# Patient Record
Sex: MALE | Race: BLACK OR AFRICAN AMERICAN
[De-identification: names, ages, dates, MRNs, and addresses within clinical notes are randomized per-mention and may not be internally consistent; named-entity substitution may affect disease eponyms.]

---

## 2017-04-26 ENCOUNTER — HOSPITAL ENCOUNTER (EMERGENCY)
Dept: HOSPITAL 23 - CED | Age: 42
LOS: 1 days | Discharge: HOME | End: 2017-04-27
Payer: COMMERCIAL

## 2017-04-26 DIAGNOSIS — E66.01: ICD-10-CM

## 2017-04-26 DIAGNOSIS — Z79.899: ICD-10-CM

## 2017-04-26 DIAGNOSIS — R10.13: Primary | ICD-10-CM

## 2017-04-27 LAB
BARBITURATES UR QL SCN: 0.5 MG/DL (ref 0.2–2)
BARBITURATES UR QL SCN: 4.3 G/DL (ref 3.5–5)
BASOPHIL#: 0.1 X10E3 (ref 0–0.3)
BASOPHIL%: 1.1 % (ref 0–2.5)
BENZODIAZ UR QL SCN: 17 U/L (ref 10–42)
BENZODIAZ UR QL SCN: 27 U/L (ref 10–40)
BLOOD UREA NITROGEN: 20 MG/DL (ref 9–23)
BUN/CREATININE RATIO: 20
BZE UR QL SCN: 66 U/L (ref 32–92)
CALCIUM SERUM: 8.9 MG/DL (ref 8.4–10.2)
CK MB SERPL-RTO: 13.6 % (ref 11–15.5)
CK MB SERPL-RTO: 33 G/DL (ref 30–36)
CREATININE SERUM: 1 MG/DL (ref 0.6–1.4)
DIFF IND: NO
EOSINOPHIL#: 0.3 X10E3 (ref 0–0.7)
EOSINOPHIL%: 2.4 % (ref 0–7)
GLOM FILT RATE ESTIMATED: 107.1 ML/MIN (ref 60–?)
GLUCOSE FASTING: 98 MG/DL (ref 70–110)
HEMATOCRIT: 43.5 % (ref 38–50)
HEMOGLOBIN: 14.4 GM/DL (ref 13–16)
KETONES UR QL: 107 MMOL/L (ref 100–111)
KETONES UR QL: 25 MMOL/L (ref 22–31)
LIPASE: 28 U/L (ref 22–51)
LYMPHOCYTE#: 3.3 X10E3 (ref 1–3.5)
LYMPHOCYTE%: 30.6 % (ref 17–45)
MEAN CELL VOLUME: 88.4 FL (ref 83–96)
MEAN CORPUSCULAR HEMOGLOBIN: 29.2 PG (ref 28–34)
MEAN PLATELET VOLUME: 9.9 FL (ref 6.5–11.5)
METHADONE UR QL SCN: <1 NG/ML (ref 0–7.9)
METHADONE UR QL SCN: <1 NG/ML (ref 0–7.9)
MONOCYTE#: 0.7 X10E3 (ref 0–1)
MONOCYTE%: 6.3 % (ref 3–12)
NEUTROPHIL#: 6.4 X10E3 (ref 1.5–7.1)
NEUTROPHIL%: 59.6 % (ref 40–75)
PLATELET COUNT: 201 X10E3 (ref 140–420)
POC - TROPONIN: <0.05 NG/ML (ref ?–0.05)
POC - TROPONIN: <0.05 NG/ML (ref ?–0.05)
POTASSIUM: 4.1 MMOL/L (ref 3.5–5.1)
PROTEIN TOTAL SERUM: 7.2 G/DL (ref 6–8.3)
RED BLOOD COUNT: 4.93 X10E (ref 3.9–5.6)
SODIUM: 137 MMOL/L (ref 135–145)
WHITE BLOOD COUNT: 10.8 X10E3 (ref 4–10.5)

## 2018-03-30 ENCOUNTER — OFFICE VISIT (OUTPATIENT)
Dept: BARIATRICS/WEIGHT MGMT | Facility: CLINIC | Age: 43
End: 2018-03-30

## 2018-03-30 VITALS
SYSTOLIC BLOOD PRESSURE: 148 MMHG | HEART RATE: 73 BPM | TEMPERATURE: 98.7 F | RESPIRATION RATE: 18 BRPM | DIASTOLIC BLOOD PRESSURE: 94 MMHG | WEIGHT: 315 LBS | BODY MASS INDEX: 46.65 KG/M2 | HEIGHT: 69 IN

## 2018-03-30 DIAGNOSIS — I10 ESSENTIAL HYPERTENSION: ICD-10-CM

## 2018-03-30 DIAGNOSIS — K21.9 GASTROESOPHAGEAL REFLUX DISEASE, ESOPHAGITIS PRESENCE NOT SPECIFIED: Primary | ICD-10-CM

## 2018-03-30 DIAGNOSIS — Z98.84 HISTORY OF LAPAROSCOPIC ADJUSTABLE GASTRIC BANDING: ICD-10-CM

## 2018-03-30 DIAGNOSIS — M25.50 MULTIPLE JOINT PAIN: ICD-10-CM

## 2018-03-30 DIAGNOSIS — R53.82 CHRONIC FATIGUE: ICD-10-CM

## 2018-03-30 DIAGNOSIS — R13.19 OTHER DYSPHAGIA: ICD-10-CM

## 2018-03-30 PROCEDURE — 99204 OFFICE O/P NEW MOD 45 MIN: CPT | Performed by: SURGERY

## 2018-03-30 NOTE — PROGRESS NOTES
[facility] BARIATRIC [facility] BARIATRIC SURGERY  [address]  [facility] [address] [phone]  [address]  [facility] [address] [phone]  Dept: [phone]  3/30/2018      Liv Prince.  [id number]  [id number]  1975  male      Chief Complaint   Patient presents with   • Consult     OPAL band 2016        Post-Op Bariatric Surgery:   Liv Prince is status post Band procedure, performed on 2/17/16 at [facility].    HPI:   Today's weight is (!) 150 kg (330 lb) pounds, today's BMI is Body mass index is 48.73 kg/m²..  his greatest weight loss from surgery was 80 pounds. The patient reports an unwanted weight gain of 80 pounds.  [unfilled] denies fever, chills, chest pain, SOA, melena, hematochezia, hematemesis, dysuria, frequency, hematuria, jaundice or abdominal pain.    43 year old gentleman with morbid obesity status post LAP-BAND placement that were 2016 at [facility] who initially lost about 80 pounds but has not followed back for the past year.  He states that he does have heartburn and that he has occasional regurgitation of solid food.  He would like to transfer his care.  He went through his diet and exercise routine and is drinking with meals and eating a fair amount of carbs.      Diet and Exercise:   Diet history reviewed and discussed with the patient. Weight loss/gains to date discussed with the patient. He reports eating 3 meals per day, a typical portion size of 1 cup, eating 2 snacks per day, drinking 3 or more 8-oz. glasses of water per day, no carbonated beverage consumption and exercising regularly.     Supplements:  None     Review of Systems   Constitutional: Positive for fatigue.   Gastrointestinal: Positive for vomiting.   Musculoskeletal: Positive for arthralgias.   All other systems reviewed and are negative.      Patient Active Problem List   Diagnosis   • BMI 45.0-49.9, adult   • GERD  (gastroesophageal reflux disease)   • Multiple joint pain   • Chronic fatigue   • Essential hypertension   • Other dysphagia   • History of laparoscopic adjustable gastric banding       Past Medical History:   Diagnosis Date   • Gout    • Hypertension    • Weight gain        Past Surgical History:   Procedure Laterality Date   • LAPAROSCOPIC GASTRIC BANDING  02/17/2016    Dr. Petit       No Known Allergies      Current Outpatient Prescriptions:   •  meloxicam (MOBIC) 15 MG tablet, Take 15 mg by mouth Daily., Disp: , Rfl:   •  naproxen (NAPROSYN) 500 MG tablet, Take 500 mg by mouth 2 (Two) Times a Day With Meals., Disp: , Rfl:     Social History     Social History   • Marital status:      Spouse name: N/A   • Number of children: N/A   • Years of education: N/A     Occupational History   • Not on file.     Social History Main Topics   • Smoking status: Former Smoker   • Smokeless tobacco: Never Used   • Alcohol use No   • Drug use: No   • Sexual activity: Not on file     Other Topics Concern   • Not on file     Social History Narrative   • No narrative on file       Family History   Problem Relation Age of Onset   • Hypertension Mother        The following portions of the patient's history were reviewed and updated as appropriate: allergies, current medications, past family history, past medical history, past social history, past surgical history and problem list.    Vitals:    03/30/18 0926   BP: 148/94   Pulse: 73   Resp: 18   Temp: 98.7 °F (37.1 °C)       Physical Exam   Constitutional: He is oriented to person, place, and time. He appears well-nourished.   HENT:   Head: Normocephalic and atraumatic.   Mouth/Throat: Oropharynx is clear and moist.   Eyes: Conjunctivae and EOM are normal. Pupils are equal, round, and reactive to light. No scleral icterus.   Neck: Normal range of motion. Neck supple. No thyromegaly present.   Cardiovascular: Normal rate and regular rhythm.    Pulmonary/Chest: Effort normal  and breath sounds normal.   Abdominal: Soft. Bowel sounds are normal. He exhibits no distension. There is no tenderness. There is no rebound and no guarding. No hernia.   Musculoskeletal: Normal range of motion.   Lymphadenopathy:     He has no cervical adenopathy.   Neurological: He is alert and oriented to person, place, and time. No cranial nerve deficit. Coordination normal.   Skin: Skin is warm and dry. No erythema.   Psychiatric: He has a normal mood and affect. His behavior is normal.   Vitals reviewed.          Assessment:   Liv Prince has severe obesity with multiple co-morbidities who would like to transfer his bariatric care to us and participate in our bariatric program.      Encounter Diagnoses   Name Primary?   • Gastroesophageal reflux disease, esophagitis presence not specified Yes   • Other dysphagia    • BMI 45.0-49.9, adult    • Multiple joint pain    • Chronic fatigue    • Essential hypertension    • History of laparoscopic adjustable gastric banding          Discussion/Summary/Plan:     43-year-old gentleman status post LAP-BAND 2016 at [facility] who like to transfer his care to us.  He would like to continue to try to work with the lap band.  I gave him our bariatric manual and went over this with him.  Also discuss bariatric advantage shakes and with our dietitian.  Instructed him to cut straight on eating lean protein vegetables and fruit.  We will start out with an upper GI to rule out pouch dilatation and LAP-BAND slip.  We'll follow-up after the upper GI.    Recommended patient be sure to eat at least three meals per day all with high lean protein, vegetables and fruit. Be sure to limit/cut back on daily simple carbohydrate intake. Discussed with the patient the recommended amount of water per day to intake. Reviewed vitamin requirements. Be sure to do routine exercise including both cardio and strength training. Recommended patient to see our dietician to go into more  detail regarding diet changes. Also discussed BMR testing.    Instructions / Recommendations: dietary counseling recommended, recommended a daily protein intake of  grams, vitamin supplements recommended, recommended exercising at least 150 minutes per week, behavior modifications recommended and instructed to call the office for concerns, questions, or problems.    The patient was instructed to follow up in 3 weeks .     The patient was counseled regarding diet, exercise and the surgical procedures available. Our bariatric manual was given to the patient and was discussed in detail. Dietician appointment was highly recommended as well as attending support groups.  Total time of encounter was over 45 minutes and 40 minutes was spent counseling.

## 2018-03-30 NOTE — PATIENT INSTRUCTIONS
Bariatric Manual    You were provided a manual specific to the procedure that you have chosen.  Please refer to that with any questions or call the office at [phone]

## 2018-04-20 ENCOUNTER — OFFICE VISIT (OUTPATIENT)
Dept: BARIATRICS/WEIGHT MGMT | Facility: CLINIC | Age: 43
End: 2018-04-20

## 2018-04-20 VITALS
HEIGHT: 69 IN | DIASTOLIC BLOOD PRESSURE: 78 MMHG | RESPIRATION RATE: 18 BRPM | BODY MASS INDEX: 46.21 KG/M2 | TEMPERATURE: 98.5 F | HEART RATE: 77 BPM | SYSTOLIC BLOOD PRESSURE: 120 MMHG | WEIGHT: 312 LBS

## 2018-04-20 DIAGNOSIS — I10 ESSENTIAL HYPERTENSION: ICD-10-CM

## 2018-04-20 DIAGNOSIS — K21.9 GASTROESOPHAGEAL REFLUX DISEASE, ESOPHAGITIS PRESENCE NOT SPECIFIED: ICD-10-CM

## 2018-04-20 DIAGNOSIS — R53.82 CHRONIC FATIGUE: ICD-10-CM

## 2018-04-20 DIAGNOSIS — Z98.84 HISTORY OF LAPAROSCOPIC ADJUSTABLE GASTRIC BANDING: ICD-10-CM

## 2018-04-20 DIAGNOSIS — M25.50 MULTIPLE JOINT PAIN: ICD-10-CM

## 2018-04-20 DIAGNOSIS — R13.19 OTHER DYSPHAGIA: ICD-10-CM

## 2018-04-20 PROCEDURE — 99213 OFFICE O/P EST LOW 20 MIN: CPT | Performed by: SURGERY

## 2018-04-20 NOTE — PROGRESS NOTES
[facility] BARIATRIC [facility] BARIATRIC SURGERY  [address]  [facility] [id number] [phone]  [address]  [facility] [id number] [phone]  Dept: [phone]  4/20/2018      Liv Prince.  [id number]  [id number]  1975  male      Chief Complaint   Patient presents with   • Follow-up     Followup AGB       BH Post-Op Bariatric Surgery:   Liv Prince is status post Lapband procedure , performed on 2/17/16 with ?mls at [facility]:   Today's weight is (!) 142 kg (312 lb) pounds, today's BMI is Body mass index is 46.05 kg/m². and he has a loss of 18 pounds since the last visit. The patient reports experiencing hunger, but decreased hunger and loss of appetite.     Liv Prince denies abdominal pain. The patientdoes not have vomiting or regurgitation. The patientdoes have heartburn/reflux.    43 old gentleman status post LAP-BAND placement every 2016 at [facility] who transferred his care to us.  I saw patient couple weeks ago and scheduled an upper GI.  Patient states that he never did get called to have this scheduled.  He did start the bariatric advantage shakes and his lost 18 pounds in 2 weeks.  Patient states he would like to continue doing the shakes but add one meal.  He states that he feels better and denies any other complaints.    Patient states their portion size is the small plate and their hunger is appropriate.    Diet and Exercise: Diet history reviewed and discussed with the patient. Weight loss/gains to date discussed with the patient. He reports eating 0 meals per day, a typical portion size of  1 cup, eating - snack per day, drinking 5 8-oz. glasses of water per day. The patient can tolerate solid protein.   The patient is eating protein first. The patient is limiting food volume. The patient is not taking vitamins. The patient is limiting snacking. The patient is regular exercise. He is not drinking  carbonated beverages.     The following portions of the patient's history were reviewed and updated as appropriate: allergies, current medications, past family history, past medical history, past social history, past surgical history and problem list.    Review of Systems   Musculoskeletal: Positive for arthralgias and back pain.   All other systems reviewed and are negative.      Vitals:    04/20/18 0833   BP: 120/78   Pulse: 77   Resp: 18   Temp: 98.5 °F (36.9 °C)       Physical Exam   Constitutional: He is oriented to person, place, and time. He appears well-nourished.   HENT:   Head: Normocephalic and atraumatic.   Mouth/Throat: Oropharynx is clear and moist.   Eyes: Conjunctivae and EOM are normal. Pupils are equal, round, and reactive to light. No scleral icterus.   Neck: Normal range of motion. Neck supple. No thyromegaly present.   Cardiovascular: Normal rate and regular rhythm.    Pulmonary/Chest: Effort normal and breath sounds normal.   Abdominal: Soft. Bowel sounds are normal. He exhibits no distension. There is no tenderness. There is no rebound and no guarding. No hernia.   Musculoskeletal: Normal range of motion.   Lymphadenopathy:     He has no cervical adenopathy.   Neurological: He is alert and oriented to person, place, and time. No cranial nerve deficit. Coordination normal.   Skin: Skin is warm and dry. No erythema.   Psychiatric: He has a normal mood and affect. His behavior is normal.   Vitals reviewed.        Assessment: Post-operatively the patient status post LAP-BAND at [facility] who transferred his care to us.  He lost 18 pounds doing the bariatric advantage shakes.  I instructed him that we need to get the upper GI which I put a another order in.  I instructed patient that if he does not get called from [facility] to call them on Monday to make sure this is scheduled.  If he has any trouble instructed him to call our office if needed.  We'll follow-up after the upper GI.  He would  like to continue with the bariatric advantage shakes but will do 3 shakes and one meal.  That one meal.  A high-protein low-carb low-calorie type meal.  Also instructed to increase his exercise..     Encounter Diagnoses   Name Primary?   • BMI 45.0-49.9, adult Yes   • Gastroesophageal reflux disease, esophagitis presence not specified    • Multiple joint pain    • Chronic fatigue    • Essential hypertension    • Other dysphagia    • History of laparoscopic adjustable gastric banding        Plan:      No adjustment today as patient has ideal level of restriction. RTC for n/v/d/regurg. Encouraged patient to be sure to eat plenty of dense lean protein and high fiber foods like vegetables and fresh fruits while reducing simple carbohydrates. Reviewed the importance of eating solid foods vs. soft which will contribute to weight gain. Discussed the recommended amount of water to intake daily- half of body weight in ounces. Not drinking with or right after meals.    Activity restrictions: None.   Instructions / Recommendations: dietary counseling recommended, recommended a daily protein intake of  grams, patient was advised that the lap band system works best when consuming solid foods, vitamin supplement(s) recommended, recommended exercising at least 150 minutes per week inclduing both cardio and strength training, behavior modifications recommended and instructed to call the office for concerns, questions, or problems.     The patient was instructed to follow up in 3-4 weeks      The patient was counseled regarding. Total time spent face to face was 25 minutes and 20 minutes was spent counseling.

## 2018-05-07 ENCOUNTER — HOSPITAL ENCOUNTER (OUTPATIENT)
Dept: GENERAL RADIOLOGY | Facility: HOSPITAL | Age: 43
Discharge: HOME | End: 2018-05-07
Attending: SURGERY | Admitting: SURGERY

## 2018-05-07 PROCEDURE — 74241: CPT

## 2018-05-09 ENCOUNTER — TELEPHONE (OUTPATIENT)
Dept: BARIATRICS/WEIGHT MGMT | Facility: CLINIC | Age: 43
End: 2018-05-09

## 2018-05-18 ENCOUNTER — OFFICE VISIT (OUTPATIENT)
Dept: BARIATRICS/WEIGHT MGMT | Facility: CLINIC | Age: 43
End: 2018-05-18

## 2018-05-18 VITALS
DIASTOLIC BLOOD PRESSURE: 85 MMHG | RESPIRATION RATE: 18 BRPM | SYSTOLIC BLOOD PRESSURE: 134 MMHG | WEIGHT: 313 LBS | HEART RATE: 67 BPM | HEIGHT: 69 IN | BODY MASS INDEX: 46.36 KG/M2

## 2018-05-18 DIAGNOSIS — M25.50 MULTIPLE JOINT PAIN: ICD-10-CM

## 2018-05-18 DIAGNOSIS — R53.82 CHRONIC FATIGUE: ICD-10-CM

## 2018-05-18 DIAGNOSIS — I10 ESSENTIAL HYPERTENSION: ICD-10-CM

## 2018-05-18 DIAGNOSIS — Z98.84 HISTORY OF LAPAROSCOPIC ADJUSTABLE GASTRIC BANDING: ICD-10-CM

## 2018-05-18 DIAGNOSIS — K21.9 GASTROESOPHAGEAL REFLUX DISEASE WITHOUT ESOPHAGITIS: ICD-10-CM

## 2018-05-18 DIAGNOSIS — R63.2 POLYPHAGIA: ICD-10-CM

## 2018-05-18 PROBLEM — R13.19 OTHER DYSPHAGIA: Status: RESOLVED | Noted: 2018-03-30 | Resolved: 2018-05-18

## 2018-05-18 PROCEDURE — 43999 UNLISTED PROCEDURE STOMACH: CPT | Performed by: SURGERY

## 2018-05-18 NOTE — PROGRESS NOTES
[facility] BARIATRIC [facility] BARIATRIC SURGERY  [address]  [facility] [address] [phone]  [address]  [facility] [id number] [phone]  Dept: [phone]  5/18/2018      Liv Prince.  [id number]  [id number]  1975  male      Chief Complaint   Patient presents with   • Follow-up     Followup AGB        Post-Op Bariatric Surgery:   Liv Prince is status post Lapband procedure , performed on 2/17/16 [facility].     HPI:   Today's weight is (!) 142 kg (313 lb)  pounds, today's BMI is Body mass index is 46.2 kg/m². and he has a gain of 1 pounds since the last visit. The patient reports a portion size larger than the small plate, increased hunger and denies a loss of appetite.     Liv Prince denies nausea, dysphagia, or abdominal pain. The patientdoes not have vomitng. The patientdoes not have reflux.    Diet and Exercise: Diet history reviewed and discussed with the patient. Weight loss/gains to date discussed with the patient. He reports eating 3 meals per day, a typical portion size of greater than 1 cup, eating 2 snack per day, drinking 3 8-oz. glasses of water per day. The patient can tolerate solid protein.   The patient is eating protein first. The patient is not limiting food volume. The patient is not taking vitamins. The patient is not limiting snacking. The patient is exercising regularly. He is not drinking carbonated beverages.     The following portions of the patient's history were reviewed and updated as appropriate: allergies, current medications, past family history, past medical history, past social history, past surgical history and problem list.    Vitals:    05/18/18 0901   BP: 134/85   Pulse: 67   Resp: 18       Review of Systems   Musculoskeletal: Positive for arthralgias and back pain.   All other systems reviewed and are negative.      Physical Exam   Constitutional: He is oriented to person, place, and time.  He appears well-nourished.   HENT:   Head: Normocephalic and atraumatic.   Mouth/Throat: Oropharynx is clear and moist.   Eyes: Conjunctivae and EOM are normal. Pupils are equal, round, and reactive to light. No scleral icterus.   Neck: Normal range of motion. Neck supple. No thyromegaly present.   Cardiovascular: Normal rate and regular rhythm.    Pulmonary/Chest: Effort normal and breath sounds normal.   Abdominal: Soft. Bowel sounds are normal. He exhibits no distension. There is no tenderness. There is no rebound. No hernia.   Musculoskeletal: Normal range of motion.   Lymphadenopathy:     He has no cervical adenopathy.   Neurological: He is alert and oriented to person, place, and time. No cranial nerve deficit. Coordination normal.   Skin: Skin is warm and dry. No erythema.   Psychiatric: He has a normal mood and affect. His behavior is normal.   Vitals reviewed.        Assessment: Post-operatively the patient status post LAP-BAND at [facility] with complaints of eating larger portions and hungry between meals.  Patient would like to have an adjustment.  Upper GI was performed which revealed a partial herniation of the LAP-BAND.  Patient is asymptomatic without any complaints.  Adjustment as below    Encounter Diagnoses   Name Primary?   • BMI 45.0-49.9, adult Yes   • Gastroesophageal reflux disease without esophagitis    • Multiple joint pain    • Chronic fatigue    • Essential hypertension    • History of laparoscopic adjustable gastric banding    • Polyphagia        Plan:     I think he would benefit from additional band restriction.  Under aseptic conditions, I accessed the port and 1.0mls of fluid were added for a total of 6.0mls.  He was able to tolerate a glass of water at the conclusion of the fill.  He was advised to consume soft solids for 24 hours before advancing back to a regular diet.  RTC for n/v/d/regurg.     We discussed his protein sources and that eating dense solid protein along  with plenty of vegetables and fresh fruits is important for weight loss. Reviewed appropriate water intake- half of body weight in ounces and exercise routine- minimum of 150 minutes per with including both cardio and strength training. Instructed not to drink with meals and wait 45 minutes after each meal before drinking.    He should follow-up in 6 weeks       Activity restrictions: None.   Instructions / Recommendations: dietary counseling recommended, recommended a daily protein intake of  grams, patient was advised that the lap band system works best when consuming solid foods, vitamin supplement(s) recommended, recommended exercising at least 150 minutes per week, behavior modifications recommended and instructed to call the office for concerns, questions, or problems.

## 2018-07-20 ENCOUNTER — OFFICE VISIT (OUTPATIENT)
Dept: BARIATRICS/WEIGHT MGMT | Facility: CLINIC | Age: 43
End: 2018-07-20

## 2018-07-20 VITALS
DIASTOLIC BLOOD PRESSURE: 93 MMHG | HEART RATE: 65 BPM | SYSTOLIC BLOOD PRESSURE: 151 MMHG | BODY MASS INDEX: 44.14 KG/M2 | TEMPERATURE: 98.9 F | RESPIRATION RATE: 16 BRPM | HEIGHT: 69 IN | WEIGHT: 298 LBS

## 2018-07-20 DIAGNOSIS — Z98.84 HISTORY OF LAPAROSCOPIC ADJUSTABLE GASTRIC BANDING: ICD-10-CM

## 2018-07-20 DIAGNOSIS — R53.82 CHRONIC FATIGUE: ICD-10-CM

## 2018-07-20 DIAGNOSIS — M25.50 MULTIPLE JOINT PAIN: ICD-10-CM

## 2018-07-20 DIAGNOSIS — I10 ESSENTIAL HYPERTENSION: ICD-10-CM

## 2018-07-20 PROBLEM — R63.2 POLYPHAGIA: Status: RESOLVED | Noted: 2018-05-18 | Resolved: 2018-07-20

## 2018-07-20 PROCEDURE — 99213 OFFICE O/P EST LOW 20 MIN: CPT | Performed by: SURGERY

## 2018-07-20 NOTE — PROGRESS NOTES
[facility] BARIATRIC [facility] BARIATRIC SURGERY  [address]  [facility] [id number] [phone]  [address]  [facility] [id number] [phone]  Dept: [phone]  7/20/2018      Liv Prince.  [id number]  [id number]  1975  male      Chief Complaint   Patient presents with   • Lap Band Adjustment     follow up       BH Post-Op Bariatric Surgery:   Liv Prince is status post Lapband procedure , performed on 2/17/16 with 6.0mls    HPI:   Today's weight is 135 kg (298 lb) pounds, today's BMI is Body mass index is 43.99 kg/m². and he has a loss of 15 pounds since the last visit. The patient reports experiencing hunger, but decreased hunger and loss of appetite.     Liv Prince denies abdominal pain. The patientdoes not have vomiting or regurgitation. The patientdoes not have heartburn/reflux.    Doing well without complaints.  1 cc was added last visit 2 months ago.  He is tolerating solid food with only rare dysphagia if he eats too fast with solids.  He feels like he has appropriate bowel fluid and is not too tight.    Patient states their portion size is the small plate and their hunger is appropriate.    Diet and Exercise: Diet history reviewed and discussed with the patient. Weight loss/gains to date discussed with the patient. He reports eating 3 meals per day, a typical portion size of 1 cup, eating 1 snack per day, drinking 4 8-oz. glasses of water per day. The patient can tolerate solid protein.   The patient is eating protein first. The patient is limiting food volume. The patient is not taking vitamins. The patient is limiting snacking. The patient is regular exercise. He is not drinking carbonated beverages.     The following portions of the patient's history were reviewed and updated as appropriate: allergies, current medications, past family history, past medical history, past social history, past surgical history and problem  list.    Review of Systems   Constitutional: Positive for fatigue.   All other systems reviewed and are negative.      Vitals:    07/20/18 1007   BP: 151/93   Pulse: 65   Resp: 16   Temp: 98.9 °F (37.2 °C)       Physical Exam   Constitutional: He is oriented to person, place, and time. He appears well-nourished.   HENT:   Head: Normocephalic and atraumatic.   Mouth/Throat: Oropharynx is clear and moist.   Eyes: Pupils are equal, round, and reactive to light. Conjunctivae and EOM are normal. No scleral icterus.   Neck: Normal range of motion. Neck supple. No thyromegaly present.   Cardiovascular: Normal rate and regular rhythm.    Pulmonary/Chest: Effort normal and breath sounds normal.   Abdominal: Soft. Bowel sounds are normal. He exhibits no distension. There is no tenderness. There is no rebound and no guarding. No hernia.   Musculoskeletal: Normal range of motion.   Lymphadenopathy:     He has no cervical adenopathy.   Neurological: He is alert and oriented to person, place, and time. No cranial nerve deficit. Coordination normal.   Skin: Skin is warm and dry. No erythema.   Psychiatric: He has a normal mood and affect. His behavior is normal.   Vitals reviewed.        Assessment: Post-operatively the patient status post LAP-BAND that were 2016 who is doing well.  Last adjustment of 1 cc 2 months ago his doing well.  He denies any dysphagia with solids except rare if he were to eat too fast which he will stop.  He does understand possibility of pouch and esophageal dilatation and LAP-BAND slip if he were to continue to regurgitate a regular basis.  We went over his diet and exercise routine and will make some changes and minimizing carbs and making sure he does take something for lunch instead of eating chips..     Encounter Diagnoses   Name Primary?   • BMI 45.0-49.9, adult (CMS/HCC) Yes   • BMI 40.0-44.9, adult (CMS/HCC)    • History of laparoscopic adjustable gastric banding    • Essential hypertension    •  Chronic fatigue    • Multiple joint pain        Plan:      No adjustment today as patient has ideal level of restriction. RTC for n/v/d/regurg. Encouraged patient to be sure to eat plenty of dense lean protein and high fiber foods like vegetables and fresh fruits while reducing simple carbohydrates. Reviewed the importance of eating solid foods vs. soft which will contribute to weight gain. Discussed the recommended amount of water to intake daily- half of body weight in ounces. Not drinking with or right after meals.    Activity restrictions: None.   Instructions / Recommendations: dietary counseling recommended, recommended a daily protein intake of  grams, patient was advised that the lap band system works best when consuming solid foods, vitamin supplement(s) recommended, recommended exercising at least 150 minutes per week inclduing both cardio and strength training, behavior modifications recommended and instructed to call the office for concerns, questions, or problems.     The patient was instructed to follow up in 3 months      The patient was counseled regarding. Total time spent face to face was 25 minutes and 20 minutes was spent counseling.

## 2019-02-08 ENCOUNTER — OFFICE VISIT (OUTPATIENT)
Dept: BARIATRICS/WEIGHT MGMT | Facility: CLINIC | Age: 44
End: 2019-02-08

## 2019-02-08 VITALS
SYSTOLIC BLOOD PRESSURE: 141 MMHG | TEMPERATURE: 98.6 F | HEART RATE: 74 BPM | HEIGHT: 69 IN | BODY MASS INDEX: 40.43 KG/M2 | DIASTOLIC BLOOD PRESSURE: 87 MMHG | WEIGHT: 273 LBS | RESPIRATION RATE: 18 BRPM

## 2019-02-08 DIAGNOSIS — Z98.84 HISTORY OF LAPAROSCOPIC ADJUSTABLE GASTRIC BANDING: ICD-10-CM

## 2019-02-08 DIAGNOSIS — R11.10 REGURGITATION OF FOOD: ICD-10-CM

## 2019-02-08 DIAGNOSIS — Z71.3 DIETARY COUNSELING: ICD-10-CM

## 2019-02-08 PROCEDURE — 99213 OFFICE O/P EST LOW 20 MIN: CPT | Performed by: NURSE PRACTITIONER

## 2019-02-08 PROCEDURE — 43999 UNLISTED PROCEDURE STOMACH: CPT | Performed by: NURSE PRACTITIONER

## 2019-02-08 NOTE — PROGRESS NOTES
[facility] BARIATRIC SURGERY  [address]  [facility] [address] [phone]  [address]  [facility] [address] [phone]  Dept: [phone]  2/8/2019      Liv Prince.  [id number]  [id number]  1975  male      Chief Complaint   Patient presents with   • Follow-up     band follow up       BH Post-Op Bariatric Surgery:   Liv Prince is status post Lapband procedure performed on 2/17/16.     HPI:   Today's weight is 124 kg (273 lb)  pounds, today's BMI is Body mass index is 40.3 kg/m². and he has a loss of 25 pounds since the last visit. The patient reports decreased hunger and  loss of appetite.     Patient admits to nausea and dysphagia. The patient denies abdominal pain. The patientdoes have vomitng. The patientdoes not have reflux.    Diet and Exercise: Diet history reviewed and discussed with the patient. Weight loss/gains to date discussed with the patient. He reports eating 3 meals per day, a typical portion size of 1 cup, eating 1 snack per day, drinking 5 8-oz. glasses of water per day. The patient can tolerate solid protein but not chicken/fish.   The patient is not eating protein first. The patient is limiting food volume. The patient is not taking vitamins. The patient is limiting snacking. The patient is exercising regularly- walking. He is not drinking carbonated beverages.     The following portions of the patient's history were reviewed and updated as appropriate: allergies, current medications, past family history, past medical history, past social history, past surgical history and problem list.    Vitals:    02/08/19 0754   BP: 141/87   Pulse: 74   Resp: 18   Temp: 98.6 °F (37 °C)       Review of Systems   Endocrine: Positive for polyphagia.   All other systems reviewed and are negative.      Physical Exam   Constitutional: He is oriented to person, place, and time. He appears well-developed and  well-nourished.   HENT:   Head: Normocephalic and atraumatic.   Eyes: EOM are normal.   Cardiovascular: Normal rate.   Pulmonary/Chest: Effort normal.   Abdominal: Soft.   Musculoskeletal: Normal range of motion.   Neurological: He is alert and oriented to person, place, and time.   Skin: Skin is warm and dry.   Psychiatric: He has a normal mood and affect. His behavior is normal. Judgment and thought content normal.   Vitals reviewed.      Assessment: Post-operatively the patient would benefit from some fluid removal    Encounter Diagnoses   Name Primary?   • BMI 40.0-44.9, adult (CMS/[facility]) Yes   • Dietary counseling    • History of laparoscopic adjustable gastric banding        Plan:    My impression is Liv Prince has too much restriction of his band.  I think he would benefit from fluid removal from his band.  Under aseptic conditions, I accessed the port and removed 0.5cc to bring the total band volume to 5.5cc.  He was able to tolerate a glass of water at the conclusion of the fill.  He was advised to consume warm liquids for today and then soft solids for 24 hours before advancing back to a regular diet.   RTC for n/v/d/regurg.     Reviewed the importance of being able to tolerate dense/solid protein and vegetables for weight loss. Discussed eating foods that are easier to tolerate, softer foods, usually contribute to weight gain because they are higher calorie/carb and will not keep patient full for the recommended 3-4 hours.      He should follow-up in 6 weeks.      UGI if symptoms persist    Activity restrictions: None.   Instructions / Recommendations: dietary counseling recommended, recommended a daily protein intake of  grams, patient was advised that the lap band system works best when consuming solid foods, vitamin supplement(s) recommended, recommended exercising at least 150 minutes per week, behavior modifications recommended and instructed to call the office for concerns, questions, or  problems.

## 2019-05-23 ENCOUNTER — OFFICE VISIT (OUTPATIENT)
Dept: BARIATRICS/WEIGHT MGMT | Facility: CLINIC | Age: 44
End: 2019-05-23

## 2019-05-23 VITALS
DIASTOLIC BLOOD PRESSURE: 74 MMHG | HEIGHT: 69 IN | WEIGHT: 266 LBS | RESPIRATION RATE: 18 BRPM | BODY MASS INDEX: 39.4 KG/M2 | HEART RATE: 117 BPM | SYSTOLIC BLOOD PRESSURE: 137 MMHG | TEMPERATURE: 99.1 F

## 2019-05-23 DIAGNOSIS — Z71.3 DIETARY COUNSELING: ICD-10-CM

## 2019-05-23 DIAGNOSIS — E66.9 OBESITY, CLASS II, BMI 35-39.9: Primary | ICD-10-CM

## 2019-05-23 DIAGNOSIS — R13.10 DYSPHAGIA, UNSPECIFIED TYPE: ICD-10-CM

## 2019-05-23 DIAGNOSIS — R11.10 REGURGITATION OF FOOD: ICD-10-CM

## 2019-05-23 PROCEDURE — 99213 OFFICE O/P EST LOW 20 MIN: CPT | Performed by: NURSE PRACTITIONER

## 2019-05-23 NOTE — PROGRESS NOTES
[facility] BARIATRIC SURGERY  [address] [facility] [address]  [facility] [address]  [phone]  [address] [address]  [phone]  Dept: [phone]  5/23/2019      Liv Prince.  [id number]  [id number]  1975  male      Chief Complaint   Patient presents with   • Follow-up     Band follow up (5.5 cc)       BH Post-Op Bariatric Surgery:   Liv Prince is status post Lapband procedure performed on 2/17/16 with 5.5mls    HPI:   Today's weight is 121 kg (266 lb) pounds, today's BMI is Body mass index is 39.26 kg/m². and he has a loss of 7 pounds since the last visit. The patient reports experiencing hunger, but decreased hunger and loss of appetite.     Liv Prince denies abdominal pain. The patientdoes have vomiting or regurgitation- with certain foods. The patientdoes not have heartburn/reflux.    Patient says he continues to have regurgitation with certain foods (bread, chicken, hamburger and some vegetables)    Patient states their portion size is the small plate and their hunger is appropriate.    Diet and Exercise: Diet history reviewed and discussed with the patient. Weight loss/gains to date discussed with the patient. He reports eating 3 meals per day, a typical portion size of 1 cup, eating 2 snack per day, drinking 2 8-oz. glasses of water per day. The patient can tolerate solid protein- certain foods like fish.   The patient is not eating protein first. The patient is limiting food volume. The patient is not taking vitamins. The patient is limiting snacking. The patient is regular exercise- walking some. He is not drinking carbonated beverages.     The following portions of the patient's history were reviewed and updated as appropriate: allergies, current medications, past family history, past medical history, past social history, past surgical history and problem list.    Review of Systems   Gastrointestinal: Positive  for vomiting.   All other systems reviewed and are negative.      Vitals:    05/23/19 1440   BP: 137/74   Pulse: 117   Resp: 18   Temp: 99.1 °F (37.3 °C)       Physical Exam   Constitutional: He is oriented to person, place, and time. He appears well-developed and well-nourished.   HENT:   Head: Normocephalic and atraumatic.   Eyes: EOM are normal.   Cardiovascular: Normal rate.   Pulmonary/Chest: Effort normal.   Abdominal: Soft.   Musculoskeletal: Normal range of motion.   Neurological: He is alert and oriented to person, place, and time.   Skin: Skin is warm and dry.   Psychiatric: He has a normal mood and affect. His behavior is normal. Judgment and thought content normal.   Vitals reviewed.      Assessment: Post-operatively the patient is doing well with his weight loss but I believe his band is too tight. He really does not want to removed any fluid but did agreed to have an UGI to evaluate the band.     Encounter Diagnoses   Name Primary?   • Obesity, Class II, BMI 35-39.9 Yes   • Dietary counseling    • Regurgitation of food    • Dysphagia, unspecified type        Plan:    Patient declined having fluid removed from his band but did agree to have an UGI.  No adjustment today as patient has ideal level of restriction. RTC for n/v/d/regurg. Encouraged patient to be sure to eat plenty of dense lean protein and high fiber foods like vegetables and fresh fruits while reducing simple carbohydrates. Reviewed the importance of eating solid foods vs. soft which will contribute to weight gain. Discussed the recommended amount of water to intake daily- half of body weight in ounces. Not drinking with or right after meals.    Activity restrictions: None.   Instructions / Recommendations: dietary counseling recommended, recommended a daily protein intake of  grams, patient was advised that the lap band system works best when consuming solid foods, vitamin supplement(s) recommended, recommended exercising at least  150 minutes per week inclduing both cardio and strength training, behavior modifications recommended and instructed to call the office for concerns, questions, or problems.     The patient was instructed to follow up after UGI      The patient was counseled regarding. Total time spent face to face was 15 minutes and 10 minutes was spent counseling.

## 2021-04-02 ENCOUNTER — BULK ORDERING (OUTPATIENT)
Dept: CASE MANAGEMENT | Facility: OTHER | Age: 46
End: 2021-04-02

## 2021-04-02 DIAGNOSIS — Z23 IMMUNIZATION DUE: ICD-10-CM

## 2021-05-19 NOTE — TELEPHONE ENCOUNTER
----- Message from Estuardo Fang Jr., MD sent at 5/8/2018  8:18 AM EDT -----  We are Trying to contact patient to give him his upper GI results which are fairly unremarkable except slightly dilated gastric pouch.  We'll follow-up in the office as scheduled  
Yes